# Patient Record
Sex: MALE | Race: BLACK OR AFRICAN AMERICAN | NOT HISPANIC OR LATINO | Employment: FULL TIME | URBAN - METROPOLITAN AREA
[De-identification: names, ages, dates, MRNs, and addresses within clinical notes are randomized per-mention and may not be internally consistent; named-entity substitution may affect disease eponyms.]

---

## 2023-04-24 ENCOUNTER — OFFICE VISIT (OUTPATIENT)
Dept: PRIMARY CARE | Facility: CLINIC | Age: 83
End: 2023-04-24
Payer: MEDICARE

## 2023-04-24 VITALS — TEMPERATURE: 97.8 F | BODY MASS INDEX: 21.61 KG/M2 | WEIGHT: 150.6 LBS

## 2023-04-24 DIAGNOSIS — I10 ESSENTIAL HYPERTENSION: ICD-10-CM

## 2023-04-24 DIAGNOSIS — E78.49 OTHER HYPERLIPIDEMIA: ICD-10-CM

## 2023-04-24 DIAGNOSIS — R35.1 BENIGN PROSTATIC HYPERPLASIA WITH NOCTURIA: ICD-10-CM

## 2023-04-24 DIAGNOSIS — J32.9 CHRONIC SINUSITIS, UNSPECIFIED LOCATION: ICD-10-CM

## 2023-04-24 DIAGNOSIS — N40.1 BENIGN PROSTATIC HYPERPLASIA WITH NOCTURIA: ICD-10-CM

## 2023-04-24 DIAGNOSIS — K21.9 GASTROESOPHAGEAL REFLUX DISEASE WITHOUT ESOPHAGITIS: ICD-10-CM

## 2023-04-24 DIAGNOSIS — M54.16 LUMBAR RADICULOPATHY: Primary | ICD-10-CM

## 2023-04-24 DIAGNOSIS — J43.9 PULMONARY EMPHYSEMA, UNSPECIFIED EMPHYSEMA TYPE (MULTI): ICD-10-CM

## 2023-04-24 DIAGNOSIS — H93.13 TINNITUS AURIUM, BILATERAL: ICD-10-CM

## 2023-04-24 DIAGNOSIS — F17.200 TOBACCO DEPENDENCE: ICD-10-CM

## 2023-04-24 PROBLEM — D17.1 LIPOMA OF BACK: Status: ACTIVE | Noted: 2023-04-24

## 2023-04-24 PROBLEM — M54.9 ACUTE BACK PAIN: Status: ACTIVE | Noted: 2023-04-24

## 2023-04-24 PROBLEM — R36.9 PENILE DISCHARGE: Status: ACTIVE | Noted: 2023-04-24

## 2023-04-24 PROBLEM — A59.9 TRICHOMONIASIS: Status: ACTIVE | Noted: 2023-04-24

## 2023-04-24 PROBLEM — D64.9 ANEMIA: Status: ACTIVE | Noted: 2023-04-24

## 2023-04-24 PROBLEM — J20.9 BRONCHITIS, ACUTE: Status: ACTIVE | Noted: 2023-04-24

## 2023-04-24 PROBLEM — R91.1 PULMONARY NODULE: Status: ACTIVE | Noted: 2023-04-24

## 2023-04-24 PROBLEM — B34.9 VIRAL SYNDROME: Status: ACTIVE | Noted: 2023-04-24

## 2023-04-24 PROBLEM — R42 DIZZINESS: Status: ACTIVE | Noted: 2023-04-24

## 2023-04-24 PROBLEM — R39.15 URINARY URGENCY: Status: ACTIVE | Noted: 2023-04-24

## 2023-04-24 PROBLEM — R07.89 ATYPICAL CHEST PAIN: Status: ACTIVE | Noted: 2023-04-24

## 2023-04-24 PROBLEM — N28.1 CYST, KIDNEY, ACQUIRED: Status: ACTIVE | Noted: 2023-04-24

## 2023-04-24 PROBLEM — E78.5 HLD (HYPERLIPIDEMIA): Status: ACTIVE | Noted: 2023-04-24

## 2023-04-24 PROBLEM — F32.A DEPRESSION: Status: ACTIVE | Noted: 2023-04-24

## 2023-04-24 PROBLEM — N48.1 BALANITIS: Status: ACTIVE | Noted: 2023-04-24

## 2023-04-24 PROBLEM — M89.8X9 BONY GROWTH: Status: ACTIVE | Noted: 2023-04-24

## 2023-04-24 PROBLEM — M48.061 SPINAL STENOSIS OF LUMBAR REGION: Status: ACTIVE | Noted: 2023-04-24

## 2023-04-24 PROBLEM — R35.0 URINARY FREQUENCY: Status: ACTIVE | Noted: 2023-04-24

## 2023-04-24 PROBLEM — M43.16 SPONDYLOLISTHESIS, LUMBAR REGION: Status: ACTIVE | Noted: 2023-04-24

## 2023-04-24 PROBLEM — R79.89 LOW TSH LEVEL: Status: ACTIVE | Noted: 2023-04-24

## 2023-04-24 PROBLEM — M54.50 LOWER BACK PAIN: Status: ACTIVE | Noted: 2023-04-24

## 2023-04-24 PROBLEM — J44.9 CHRONIC OBSTRUCTIVE PULMONARY DISEASE (MULTI): Status: ACTIVE | Noted: 2023-04-24

## 2023-04-24 PROCEDURE — 1160F RVW MEDS BY RX/DR IN RCRD: CPT | Performed by: INTERNAL MEDICINE

## 2023-04-24 PROCEDURE — G0439 PPPS, SUBSEQ VISIT: HCPCS | Performed by: INTERNAL MEDICINE

## 2023-04-24 PROCEDURE — 99214 OFFICE O/P EST MOD 30 MIN: CPT | Performed by: INTERNAL MEDICINE

## 2023-04-24 PROCEDURE — 1159F MED LIST DOCD IN RCRD: CPT | Performed by: INTERNAL MEDICINE

## 2023-04-24 PROCEDURE — 4004F PT TOBACCO SCREEN RCVD TLK: CPT | Performed by: INTERNAL MEDICINE

## 2023-04-24 RX ORDER — NAPROXEN 500 MG/1
1 TABLET ORAL
COMMUNITY
Start: 2018-07-23

## 2023-04-24 RX ORDER — VIT C/E/ZN/COPPR/LUTEIN/ZEAXAN 250MG-90MG
CAPSULE ORAL
COMMUNITY

## 2023-04-24 RX ORDER — HYDROCHLOROTHIAZIDE 12.5 MG/1
12.5 TABLET ORAL EVERY MORNING
Qty: 30 TABLET | Refills: 2 | Status: SHIPPED | OUTPATIENT
Start: 2023-04-24 | End: 2023-04-26 | Stop reason: SDUPTHER

## 2023-04-24 RX ORDER — MECLIZINE HYDROCHLORIDE 25 MG/1
1 TABLET ORAL 3 TIMES DAILY PRN
COMMUNITY
Start: 2021-09-10

## 2023-04-24 RX ORDER — SIMVASTATIN 40 MG/1
40 TABLET, FILM COATED ORAL EVERY EVENING
COMMUNITY
End: 2023-08-15 | Stop reason: SDUPTHER

## 2023-04-24 RX ORDER — IBUPROFEN 800 MG/1
1 TABLET ORAL EVERY 8 HOURS PRN
COMMUNITY
Start: 2022-12-20

## 2023-04-24 RX ORDER — HYDROCHLOROTHIAZIDE 12.5 MG/1
12.5 TABLET ORAL EVERY MORNING
COMMUNITY
End: 2023-04-24 | Stop reason: SDUPTHER

## 2023-04-24 RX ORDER — PNEUMOCOCCAL VACCINE POLYVALENT 25; 25; 25; 25; 25; 25; 25; 25; 25; 25; 25; 25; 25; 25; 25; 25; 25; 25; 25; 25; 25; 25; 25 UG/.5ML; UG/.5ML; UG/.5ML; UG/.5ML; UG/.5ML; UG/.5ML; UG/.5ML; UG/.5ML; UG/.5ML; UG/.5ML; UG/.5ML; UG/.5ML; UG/.5ML; UG/.5ML; UG/.5ML; UG/.5ML; UG/.5ML; UG/.5ML; UG/.5ML; UG/.5ML; UG/.5ML; UG/.5ML; UG/.5ML
INJECTION, SOLUTION INTRAMUSCULAR; SUBCUTANEOUS
COMMUNITY
Start: 2021-03-12

## 2023-04-24 ASSESSMENT — ENCOUNTER SYMPTOMS
OCCASIONAL FEELINGS OF UNSTEADINESS: 0
DEPRESSION: 0
LOSS OF SENSATION IN FEET: 0

## 2023-04-24 NOTE — PROGRESS NOTES
"Gordon Camilo is a 83 y.o. male here for a Medicare Wellness Exam.    No chief complaint on file.       Patient with a past medical history of COPD, HTN, HLD, GERD, Depression Lumbar stenosis, BPH with LUTS Pulmonary Nodule, Tubular adenoma (due 2025)    Has tinnitus  Both ears, no hearing loss  But has been exposed to loud noises at work    Still smoking, now beginning to notice NGUYEN  1PPD  No chest pain/ dizziness  BM OK  Energy level ok  Appetite OK             Medicare Wellness Exam    The patent is being seen for a follow up annual wellness visit  Past Medical, Surgical and family History: Reviewed and updated in chart  Interval History: Patient has not been hospitalized previously  Medications and Supplements: Review of all medications by a prescribing practitioner or clinical pharmacist (such as prescriptions, OTC, Herbal therapies and supplements) documented in the medical record.    Patient Self-Assessment of health: Good  Tobacco Use: Yes  Alcohol Use: Yes  Illicit drug use: No  Patient using opioids: No    Current Diet: in general, a \"healthy\" diet    Adequate fluid intake: Yes  Caffeine intake: Yes  Exercise frequency: not active    Depression/Suicide screening: PHQ2/ PHQ9 (see screenings tab)    Hearing impairment: No  Uses hearing aids N/A  Cognitive impairment Observation: No   Patient or family reported cognitive impairment: No    Bathing: independent  Dressing: independent  Walking: independent  Taking Medications: independent  Feeding: independent  Personal Hygiene: independent  Managing Finances: independent  Shopping: independent  Housework/Basic Home Maintenance: independent  Handling transportation: independent  Preparing meals: independent    Bowels: continent  Bladder: continent    Falls Risk: has notfallen in last 6 months.   Their fall has not resulted in an injury.   Fall risk Factors: Fall Risk Factors:    none   Care Plan Risk: Care Plan: Low/Moderate Risk: Regular physical activity such " as walking, water aerobics or arcenio chi to improve strength, balance, coordination and flexibility. Wear appropriate, sensible shoe wear. Remove fall hazards at home such as loose rugs, obstacles, use non-slip surface in bath or shower. Keep living space well lit.      Home Safety Risk Factors: Home Safety Risk Factors: None  Advanced Directives:  Living will: No POA: No    Patient's End of Life Decisions: Provider agree to follow.      Past Medical History:   Diagnosis Date    Chronic sinusitis, unspecified 09/23/2015    Chronic sinusitis    Essential (primary) hypertension 10/17/2022    Hypertension    Male erectile dysfunction, unspecified 06/14/2013    Organic impotence    Meniere's disease, 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        Review of Systems   Constitutional: no fever, no chills, not feeling poorly, not feeling tired and no recent weight gain, no recent weight loss.   ENT: no earache, no hearing loss, no nosebleeds, no nasal discharge, no sore throat and no hoarseness.   Cardiovascular: the heart rate was not slow, the heart rate was not fast, no chest pain, no palpitations, no intermittent leg claudication and no lower extremity edema.   Respiratory: no cough, wheezing or shortness of breath at rest or exertion  Gastrointestinal: no abdominal pain, no constipation, no melena, no nausea, no diarrhea, no vomiting and no blood in stools.   Musculoskeletal: no arthralgias, no myalgias, no back pain, no joint swelling, no joint stiffness, no limb pain and no limb swelling.   Integumentary: no skin rashes, no skin lesions, no itching, no skin wound and no dry skin.   Neurological: no headache, no confusion, no numbness, no dizziness, no tingling and no fainting.   All other systems have been reviewed and are negative for complaint.   "        12/30/2021    10:48 AM 4/5/2022    10:00 AM 4/5/2022    10:14 AM 7/8/2022    10:16 AM 10/17/2022     4:40 PM 10/17/2022     4:56 PM 4/24/2023     4:35 PM   Vitals   Systolic   130   120    Diastolic   70   70    Heart Rate   72   70    Temp  36.9 °C (98.4 °F)     36.6 °C (97.8 °F)   Resp   16   16    Height (in) 1.778 m (5' 10\") 1.778 m (5' 10\")  1.778 m (5' 10\")      Weight (lb) 150 149.38  151.38 150  150.6   BMI 21.52 kg/m2 21.43 kg/m2  21.72 kg/m2 21.52 kg/m2  21.61 kg/m2   BSA (m2) 1.83 m2 1.83 m2  1.84 m2 1.83 m2  1.84 m2   Visit Report       Report       Physical Exam   Constitutional   General appearance: Alert and in no acute distress.     Pulmonary   Respiratory assessment: No respiratory distress, normal respiratory rhythm and effort.    Auscultation of Lungs: Clear bilateral breath sounds.   Cardiovascular   Auscultation of heart: Apical pulse normal, heart rate and rhythm normal, normal S1 and S2, no murmurs and no pericardial rub.    Exam for edema: No peripheral edema.   Abdomen   Abdominal Exam: No bruits, normal bowel sounds, soft, non-tender, no abdominal mass palpated.    Liver and Spleen exam: No hepato-splenomegaly.   Musculoskeletal   Examination of gait: Normal.    Inspection of digits and nails: No clubbing or cyanosis of the fingernails.    Inspection/palpation of joints, bones and muscles: No joint swelling. Normal movement of all extremities.   Skin   Skin inspection: Normal skin color and pigmentation, normal skin turgor and no visible rash.   Neurologic   Cranial nerves: Nerves 2-12 were intact, no focal neuro defects.         No results found for requested labs within last 365 days.       Assessment/Plan          Patient with a past medical history of COPD, HTN, HLD, GERD, Depression Lumbar stenosis, BPH with LUTS Pulmonary Nodule, Tubular adenoma (due 2025)     # HTN  condition is stable  continue current medications        # HLD  condition is stable  continue current " medications        # Lumbar stenosis  condition is stable  continue current medications     # BPH with LUTS  stable  minimal Nocturia     # Tobacco dependance/ COPD  counseled again    Discussed getting Health Care Power of  and Living Will Documents and their importance. These are legal documents obtained through a . Total Time spent in this discussion was less than 30 minutes

## 2023-04-25 ENCOUNTER — LAB (OUTPATIENT)
Dept: LAB | Facility: LAB | Age: 83
End: 2023-04-25
Payer: MEDICARE

## 2023-04-25 DIAGNOSIS — I10 ESSENTIAL HYPERTENSION: ICD-10-CM

## 2023-04-25 LAB
ALANINE AMINOTRANSFERASE (SGPT) (U/L) IN SER/PLAS: 16 U/L (ref 10–52)
ALBUMIN (G/DL) IN SER/PLAS: 4.6 G/DL (ref 3.4–5)
ALKALINE PHOSPHATASE (U/L) IN SER/PLAS: 77 U/L (ref 33–136)
ANION GAP IN SER/PLAS: 12 MMOL/L (ref 10–20)
ASPARTATE AMINOTRANSFERASE (SGOT) (U/L) IN SER/PLAS: 22 U/L (ref 9–39)
BILIRUBIN TOTAL (MG/DL) IN SER/PLAS: 0.4 MG/DL (ref 0–1.2)
CALCIUM (MG/DL) IN SER/PLAS: 9.9 MG/DL (ref 8.6–10.6)
CARBON DIOXIDE, TOTAL (MMOL/L) IN SER/PLAS: 29 MMOL/L (ref 21–32)
CHLORIDE (MMOL/L) IN SER/PLAS: 103 MMOL/L (ref 98–107)
CREATININE (MG/DL) IN SER/PLAS: 1.12 MG/DL (ref 0.5–1.3)
ERYTHROCYTE DISTRIBUTION WIDTH (RATIO) BY AUTOMATED COUNT: 13.8 % (ref 11.5–14.5)
ERYTHROCYTE MEAN CORPUSCULAR HEMOGLOBIN CONCENTRATION (G/DL) BY AUTOMATED: 32.6 G/DL (ref 32–36)
ERYTHROCYTE MEAN CORPUSCULAR VOLUME (FL) BY AUTOMATED COUNT: 100 FL (ref 80–100)
ERYTHROCYTES (10*6/UL) IN BLOOD BY AUTOMATED COUNT: 3.87 X10E12/L (ref 4.5–5.9)
GFR MALE: 66 ML/MIN/1.73M2
GLUCOSE (MG/DL) IN SER/PLAS: 105 MG/DL (ref 74–99)
HEMATOCRIT (%) IN BLOOD BY AUTOMATED COUNT: 38.6 % (ref 41–52)
HEMOGLOBIN (G/DL) IN BLOOD: 12.6 G/DL (ref 13.5–17.5)
LEUKOCYTES (10*3/UL) IN BLOOD BY AUTOMATED COUNT: 8 X10E9/L (ref 4.4–11.3)
NRBC (PER 100 WBCS) BY AUTOMATED COUNT: 0 /100 WBC (ref 0–0)
PLATELETS (10*3/UL) IN BLOOD AUTOMATED COUNT: 319 X10E9/L (ref 150–450)
POTASSIUM (MMOL/L) IN SER/PLAS: 4.7 MMOL/L (ref 3.5–5.3)
PROTEIN TOTAL: 7.5 G/DL (ref 6.4–8.2)
SODIUM (MMOL/L) IN SER/PLAS: 139 MMOL/L (ref 136–145)
THYROTROPIN (MIU/L) IN SER/PLAS BY DETECTION LIMIT <= 0.05 MIU/L: 0.3 MIU/L (ref 0.44–3.98)
THYROXINE (T4) FREE (NG/DL) IN SER/PLAS: 1.15 NG/DL (ref 0.78–1.48)
UREA NITROGEN (MG/DL) IN SER/PLAS: 17 MG/DL (ref 6–23)

## 2023-04-25 PROCEDURE — 84443 ASSAY THYROID STIM HORMONE: CPT

## 2023-04-25 PROCEDURE — 80053 COMPREHEN METABOLIC PANEL: CPT

## 2023-04-25 PROCEDURE — 36415 COLL VENOUS BLD VENIPUNCTURE: CPT

## 2023-04-25 PROCEDURE — 84439 ASSAY OF FREE THYROXINE: CPT

## 2023-04-25 PROCEDURE — 85027 COMPLETE CBC AUTOMATED: CPT

## 2023-04-26 DIAGNOSIS — I10 ESSENTIAL HYPERTENSION: ICD-10-CM

## 2023-04-27 RX ORDER — HYDROCHLOROTHIAZIDE 12.5 MG/1
12.5 TABLET ORAL EVERY MORNING
Qty: 30 TABLET | Refills: 2 | Status: SHIPPED | OUTPATIENT
Start: 2023-04-27 | End: 2023-08-15 | Stop reason: SDUPTHER

## 2023-08-03 ENCOUNTER — APPOINTMENT (OUTPATIENT)
Dept: PRIMARY CARE | Facility: CLINIC | Age: 83
End: 2023-08-03
Payer: MEDICARE

## 2023-08-15 ENCOUNTER — OFFICE VISIT (OUTPATIENT)
Dept: PRIMARY CARE | Facility: CLINIC | Age: 83
End: 2023-08-15
Payer: MEDICARE

## 2023-08-15 VITALS
WEIGHT: 149.4 LBS | SYSTOLIC BLOOD PRESSURE: 130 MMHG | RESPIRATION RATE: 16 BRPM | TEMPERATURE: 98.4 F | BODY MASS INDEX: 21.44 KG/M2 | DIASTOLIC BLOOD PRESSURE: 70 MMHG | HEART RATE: 70 BPM

## 2023-08-15 DIAGNOSIS — E78.49 OTHER HYPERLIPIDEMIA: ICD-10-CM

## 2023-08-15 DIAGNOSIS — N40.1 BENIGN PROSTATIC HYPERPLASIA WITH NOCTURIA: ICD-10-CM

## 2023-08-15 DIAGNOSIS — R35.1 BENIGN PROSTATIC HYPERPLASIA WITH NOCTURIA: ICD-10-CM

## 2023-08-15 DIAGNOSIS — J43.9 PULMONARY EMPHYSEMA, UNSPECIFIED EMPHYSEMA TYPE (MULTI): ICD-10-CM

## 2023-08-15 DIAGNOSIS — I10 ESSENTIAL HYPERTENSION: Primary | ICD-10-CM

## 2023-08-15 PROBLEM — H90.3 BILATERAL SENSORINEURAL HEARING LOSS: Status: ACTIVE | Noted: 2023-08-15

## 2023-08-15 LAB
POC HDL CHOLESTEROL: 82 MG/DL (ref 0–40)
POC LDL CHOLESTEROL: 91 MG/DL (ref 0–100)
POC NON-HDL CHOLESTEROL: 111 MG/DL (ref 0–130)
POC TOTAL CHOLESTEROL/HDL RATIO: 2.4 (ref 0–4.5)
POC TOTAL CHOLESTEROL: 193 MG/DL (ref 0–199)
POC TRIGLYCERIDES: 105 MG/DL (ref 0–150)

## 2023-08-15 PROCEDURE — 80061 LIPID PANEL: CPT | Performed by: INTERNAL MEDICINE

## 2023-08-15 PROCEDURE — 3078F DIAST BP <80 MM HG: CPT | Performed by: INTERNAL MEDICINE

## 2023-08-15 PROCEDURE — 4004F PT TOBACCO SCREEN RCVD TLK: CPT | Performed by: INTERNAL MEDICINE

## 2023-08-15 PROCEDURE — 1159F MED LIST DOCD IN RCRD: CPT | Performed by: INTERNAL MEDICINE

## 2023-08-15 PROCEDURE — 1126F AMNT PAIN NOTED NONE PRSNT: CPT | Performed by: INTERNAL MEDICINE

## 2023-08-15 PROCEDURE — 1160F RVW MEDS BY RX/DR IN RCRD: CPT | Performed by: INTERNAL MEDICINE

## 2023-08-15 PROCEDURE — 99214 OFFICE O/P EST MOD 30 MIN: CPT | Performed by: INTERNAL MEDICINE

## 2023-08-15 PROCEDURE — 3075F SYST BP GE 130 - 139MM HG: CPT | Performed by: INTERNAL MEDICINE

## 2023-08-15 RX ORDER — ACETAMINOPHEN 500 MG
1 TABLET ORAL DAILY
COMMUNITY

## 2023-08-15 RX ORDER — POTASSIUM CHLORIDE 1500 MG/1
20 TABLET, EXTENDED RELEASE ORAL DAILY
COMMUNITY
Start: 2015-12-26 | End: 2023-11-21 | Stop reason: SDUPTHER

## 2023-08-15 RX ORDER — SIMVASTATIN 40 MG/1
40 TABLET, FILM COATED ORAL EVERY EVENING
Qty: 60 TABLET | Refills: 2 | Status: SHIPPED | OUTPATIENT
Start: 2023-08-15 | End: 2023-11-21 | Stop reason: SDUPTHER

## 2023-08-15 RX ORDER — HYDROCHLOROTHIAZIDE 12.5 MG/1
12.5 TABLET ORAL EVERY MORNING
Qty: 30 TABLET | Refills: 2 | Status: SHIPPED | OUTPATIENT
Start: 2023-08-15 | End: 2023-11-21 | Stop reason: SDUPTHER

## 2023-08-15 NOTE — PROGRESS NOTES
Gordon Camilo is a 83 y.o. male   Patient with a past medical history of COPD, HTN, HLD, GERD, Depression Lumbar stenosis, BPH with LUTS Pulmonary Nodule, Renal Cysts, Tubular adenoma (due 2025)    Gets occasional numbness in left elbow that comes and goes with certain hand positions    Feels fine  No chest pain/  SOB/ dizziness  BM OK  Energy level ok  Appetite OK             Review of Systems     Constitutional: no fever, no chills, not feeling poorly, not feeling tired and no recent weight gain, no recent weight loss.   ENT: no earache, no hearing loss, no nosebleeds, no nasal discharge, no sore throat and no hoarseness.   Cardiovascular: the heart rate was not slow, the heart rate was not fast, no chest pain, no palpitations, no intermittent leg claudication and no lower extremity edema.   Respiratory: no cough, wheezing or shortness of breath at rest or exertion  Gastrointestinal: no abdominal pain, no constipation, no melena, no nausea, no diarrhea, no vomiting and no blood in stools.   Musculoskeletal: no arthralgias, no myalgias, no back pain, no joint swelling, no joint stiffness, no limb pain and no limb swelling.   Integumentary: no skin rashes, no skin lesions, no itching, no skin wound and no dry skin.   Neurological: no headache, no confusion, no numbness, no dizziness, no tingling and no fainting.   All other systems have been reviewed and are negative for complaint.       Vitals:    08/15/23 1659   Temp: 36.9 °C (98.4 °F)        Physical Exam     Constitutional   General appearance: Alert and in no acute distress.     Pulmonary   Respiratory assessment: No respiratory distress, normal respiratory rhythm and effort.    Auscultation of Lungs: Clear bilateral breath sounds.   Cardiovascular   Auscultation of heart: Apical pulse normal, heart rate and rhythm normal, normal S1 and S2, no murmurs and no pericardial rub.    Exam for edema: No peripheral edema.   Abdomen   Abdominal Exam: No bruits, normal  bowel sounds, soft, non-tender, no abdominal mass palpated.    Liver and Spleen exam: No hepato-splenomegaly.   Musculoskeletal   Examination of gait: Normal.    Inspection of digits and nails: No clubbing or cyanosis of the fingernails.    Inspection/palpation of joints, bones and muscles: No joint swelling. Normal movement of all extremities.   Skin   Skin inspection: Normal skin color and pigmentation, normal skin turgor and no visible rash.   Neurologic   Cranial nerves: Nerves 2-12 were intact, no focal neuro defects.     Assessment/Plan        Patient with a past medical history of COPD, HTN, HLD, GERD, Depression Lumbar stenosis, BPH with LUTS Pulmonary Nodule,  Renal Cysts,Tubular adenoma (due 2025)    # HTN/ Hypokalemia  Stable  Continue current medications    # HLD  Stable  Continue current medications  Watch salt, avoid excessive caffeine  Avoid processed meats/ sugars/juices Instead eat fresh fruit  Add walnuts and almonds to your diet  exercise 6 days a week for 30 minutes    # COPD/ Tobacco dependence  Counseled again

## 2023-11-21 ENCOUNTER — OFFICE VISIT (OUTPATIENT)
Dept: PRIMARY CARE | Facility: CLINIC | Age: 83
End: 2023-11-21
Payer: MEDICARE

## 2023-11-21 VITALS
DIASTOLIC BLOOD PRESSURE: 70 MMHG | TEMPERATURE: 98.3 F | WEIGHT: 146.2 LBS | BODY MASS INDEX: 20.98 KG/M2 | SYSTOLIC BLOOD PRESSURE: 120 MMHG | HEART RATE: 70 BPM | RESPIRATION RATE: 18 BRPM

## 2023-11-21 DIAGNOSIS — I10 ESSENTIAL HYPERTENSION: ICD-10-CM

## 2023-11-21 DIAGNOSIS — E87.6 HYPOKALEMIA: ICD-10-CM

## 2023-11-21 DIAGNOSIS — E78.49 OTHER HYPERLIPIDEMIA: Primary | ICD-10-CM

## 2023-11-21 DIAGNOSIS — Z23 INFLUENZA VACCINATION ADMINISTERED AT CURRENT VISIT: ICD-10-CM

## 2023-11-21 PROCEDURE — 1159F MED LIST DOCD IN RCRD: CPT | Performed by: INTERNAL MEDICINE

## 2023-11-21 PROCEDURE — 99214 OFFICE O/P EST MOD 30 MIN: CPT | Performed by: INTERNAL MEDICINE

## 2023-11-21 PROCEDURE — 3074F SYST BP LT 130 MM HG: CPT | Performed by: INTERNAL MEDICINE

## 2023-11-21 PROCEDURE — 1126F AMNT PAIN NOTED NONE PRSNT: CPT | Performed by: INTERNAL MEDICINE

## 2023-11-21 PROCEDURE — 1160F RVW MEDS BY RX/DR IN RCRD: CPT | Performed by: INTERNAL MEDICINE

## 2023-11-21 PROCEDURE — 90662 IIV NO PRSV INCREASED AG IM: CPT | Performed by: INTERNAL MEDICINE

## 2023-11-21 PROCEDURE — 3078F DIAST BP <80 MM HG: CPT | Performed by: INTERNAL MEDICINE

## 2023-11-21 PROCEDURE — G0008 ADMIN INFLUENZA VIRUS VAC: HCPCS | Performed by: INTERNAL MEDICINE

## 2023-11-21 PROCEDURE — 4004F PT TOBACCO SCREEN RCVD TLK: CPT | Performed by: INTERNAL MEDICINE

## 2023-11-21 RX ORDER — POTASSIUM CHLORIDE 20 MEQ/1
20 TABLET, EXTENDED RELEASE ORAL DAILY
Qty: 90 TABLET | Refills: 3 | Status: SHIPPED | OUTPATIENT
Start: 2023-11-21 | End: 2024-11-15

## 2023-11-21 RX ORDER — SIMVASTATIN 40 MG/1
40 TABLET, FILM COATED ORAL EVERY EVENING
Qty: 90 TABLET | Refills: 2 | Status: SHIPPED | OUTPATIENT
Start: 2023-11-21 | End: 2024-08-17

## 2023-11-21 RX ORDER — HYDROCHLOROTHIAZIDE 12.5 MG/1
12.5 TABLET ORAL EVERY MORNING
Qty: 60 TABLET | Refills: 3 | Status: SHIPPED | OUTPATIENT
Start: 2023-11-21 | End: 2024-01-05 | Stop reason: SDUPTHER

## 2023-11-21 NOTE — PROGRESS NOTES
Gordon Camilo is a 83 y.o. male   Patient with a past medical history of COPD, HTN, HLD, GERD, Depression Lumbar stenosis, BPH with LUTS Pulmonary Nodule, Renal Cysts, Tubular adenoma (due 2025)    Got the RSV/ Flu/ Covid vaccines      Feels fine  No chest pain/  SOB/ dizziness  BM OK  Energy level ok  Appetite OK    Still smokes  No SOB./ cough             Review of Systems     Constitutional: no fever, no chills, not feeling poorly, not feeling tired and no recent weight gain, no recent weight loss.   ENT: no earache, no hearing loss, no nosebleeds, no nasal discharge, no sore throat and no hoarseness.   Cardiovascular: the heart rate was not slow, the heart rate was not fast, no chest pain, no palpitations, no intermittent leg claudication and no lower extremity edema.   Respiratory: no cough, wheezing or shortness of breath at rest or exertion  Gastrointestinal: no abdominal pain, no constipation, no melena, no nausea, no diarrhea, no vomiting and no blood in stools.   Musculoskeletal: no arthralgias, no myalgias, no back pain, no joint swelling, no joint stiffness, no limb pain and no limb swelling.   Integumentary: no skin rashes, no skin lesions, no itching, no skin wound and no dry skin.   Neurological: no headache, no confusion, no numbness, no dizziness, no tingling and no fainting.   All other systems have been reviewed and are negative for complaint.       Vitals:    11/21/23 1658   BP: 120/70   Pulse: 70   Resp: 18   Temp: 36.8 °C (98.3 °F)        Physical Exam     Constitutional   General appearance: Alert and in no acute distress.     Pulmonary   Respiratory assessment: No respiratory distress, normal respiratory rhythm and effort.    Auscultation of Lungs: Clear bilateral breath sounds.   Cardiovascular   Auscultation of heart: Apical pulse normal, heart rate and rhythm normal, normal S1 and S2, no murmurs and no pericardial rub.    Exam for edema: No peripheral edema.   Abdomen   Abdominal Exam: No  bruits, normal bowel sounds, soft, non-tender, no abdominal mass palpated.    Liver and Spleen exam: No hepato-splenomegaly.   Musculoskeletal   Examination of gait: Normal.    Inspection of digits and nails: No clubbing or cyanosis of the fingernails.    Inspection/palpation of joints, bones and muscles: No joint swelling. Normal movement of all extremities.   Skin   Skin inspection: Normal skin color and pigmentation, normal skin turgor and no visible rash.   Neurologic   Cranial nerves: Nerves 2-12 were intact, no focal neuro defects.     Assessment/Plan        Patient with a past medical history of COPD, HTN, HLD, GERD, Depression Lumbar stenosis, BPH with LUTS Pulmonary Nodule,  Renal Cysts,Tubular adenoma (due 2025)    # HTN/ Hypokalemia  Stable  Continue current medications    # HLD  Stable  Continue current medications  Watch salt, avoid excessive caffeine  Avoid processed meats/ sugars/juices Instead eat fresh fruit  Add walnuts and almonds to your diet  exercise 6 days a week for 30 minutes    # COPD/ Tobacco dependence  Counseled again    Total appt time today was 35  minutes. Time included preparing to see the pt, obtaining the hx, performing the medically necessary appropriate physical exam, counseling & educating the pt, ordering tests & procedures, referring & communicating w/other providers, independently interpreting results & communicating the results to the pt, care, coordination & documenting clinical information in the medical record.

## 2024-01-05 DIAGNOSIS — I10 ESSENTIAL HYPERTENSION: ICD-10-CM

## 2024-01-05 RX ORDER — HYDROCHLOROTHIAZIDE 12.5 MG/1
12.5 TABLET ORAL EVERY MORNING
Qty: 60 TABLET | Refills: 3 | Status: SHIPPED | OUTPATIENT
Start: 2024-01-05 | End: 2024-09-01

## 2024-01-05 NOTE — TELEPHONE ENCOUNTER
RX: Hydrochlorothiazide 12.5 mg tab            [facility]

## 2024-02-26 ENCOUNTER — HOSPITAL ENCOUNTER (OUTPATIENT)
Dept: RADIOLOGY | Facility: HOSPITAL | Age: 84
Discharge: HOME | End: 2024-02-26
Payer: MEDICARE

## 2024-02-26 ENCOUNTER — OFFICE VISIT (OUTPATIENT)
Dept: PRIMARY CARE | Facility: CLINIC | Age: 84
End: 2024-02-26
Payer: MEDICARE

## 2024-02-26 ENCOUNTER — LAB (OUTPATIENT)
Dept: LAB | Facility: LAB | Age: 84
End: 2024-02-26
Payer: MEDICARE

## 2024-02-26 VITALS
SYSTOLIC BLOOD PRESSURE: 130 MMHG | RESPIRATION RATE: 18 BRPM | BODY MASS INDEX: 20.98 KG/M2 | HEART RATE: 68 BPM | DIASTOLIC BLOOD PRESSURE: 70 MMHG | WEIGHT: 146.2 LBS | TEMPERATURE: 98.4 F

## 2024-02-26 DIAGNOSIS — J43.9 PULMONARY EMPHYSEMA, UNSPECIFIED EMPHYSEMA TYPE (MULTI): ICD-10-CM

## 2024-02-26 DIAGNOSIS — E46 PROTEIN-CALORIE MALNUTRITION, UNSPECIFIED SEVERITY (MULTI): ICD-10-CM

## 2024-02-26 DIAGNOSIS — I10 ESSENTIAL HYPERTENSION: ICD-10-CM

## 2024-02-26 DIAGNOSIS — F17.200 TOBACCO DEPENDENCE: ICD-10-CM

## 2024-02-26 DIAGNOSIS — Z71.89 ADVANCED CARE PLANNING/COUNSELING DISCUSSION: ICD-10-CM

## 2024-02-26 DIAGNOSIS — E78.49 OTHER HYPERLIPIDEMIA: ICD-10-CM

## 2024-02-26 DIAGNOSIS — Z00.00 ROUTINE GENERAL MEDICAL EXAMINATION AT HEALTH CARE FACILITY: Primary | ICD-10-CM

## 2024-02-26 LAB
ALBUMIN SERPL BCP-MCNC: 4.4 G/DL (ref 3.4–5)
ALP SERPL-CCNC: 73 U/L (ref 33–136)
ALT SERPL W P-5'-P-CCNC: 10 U/L (ref 10–52)
ANION GAP SERPL CALC-SCNC: 13 MMOL/L (ref 10–20)
AST SERPL W P-5'-P-CCNC: 17 U/L (ref 9–39)
BILIRUB SERPL-MCNC: 0.3 MG/DL (ref 0–1.2)
BUN SERPL-MCNC: 25 MG/DL (ref 6–23)
CALCIUM SERPL-MCNC: 9.9 MG/DL (ref 8.6–10.3)
CHLORIDE SERPL-SCNC: 102 MMOL/L (ref 98–107)
CO2 SERPL-SCNC: 28 MMOL/L (ref 21–32)
CREAT SERPL-MCNC: 1.25 MG/DL (ref 0.5–1.3)
EGFRCR SERPLBLD CKD-EPI 2021: 57 ML/MIN/1.73M*2
ERYTHROCYTE [DISTWIDTH] IN BLOOD BY AUTOMATED COUNT: 14 % (ref 11.5–14.5)
GLUCOSE SERPL-MCNC: 88 MG/DL (ref 74–99)
HCT VFR BLD AUTO: 39.5 % (ref 41–52)
HGB BLD-MCNC: 13 G/DL (ref 13.5–17.5)
MCH RBC QN AUTO: 32.4 PG (ref 26–34)
MCHC RBC AUTO-ENTMCNC: 32.9 G/DL (ref 32–36)
MCV RBC AUTO: 99 FL (ref 80–100)
NRBC BLD-RTO: 0 /100 WBCS (ref 0–0)
PLATELET # BLD AUTO: 333 X10*3/UL (ref 150–450)
POC HDL CHOLESTEROL: 70 MG/DL (ref 0–40)
POC LDL CHOLESTEROL: 56 MG/DL (ref 0–100)
POC NON-HDL CHOLESTEROL: 76 MG/DL (ref 0–130)
POC TOTAL CHOLESTEROL/HDL RATIO: 2.1 (ref 0–4.5)
POC TOTAL CHOLESTEROL: 146 MG/DL (ref 0–199)
POC TRIGLYCERIDES: 100 MG/DL (ref 0–150)
POTASSIUM SERPL-SCNC: 4.7 MMOL/L (ref 3.5–5.3)
PROT SERPL-MCNC: 7.4 G/DL (ref 6.4–8.2)
RBC # BLD AUTO: 4.01 X10*6/UL (ref 4.5–5.9)
SODIUM SERPL-SCNC: 138 MMOL/L (ref 136–145)
T4 FREE SERPL-MCNC: 0.71 NG/DL (ref 0.61–1.12)
TSH SERPL-ACNC: 0.23 MIU/L (ref 0.44–3.98)
WBC # BLD AUTO: 7.1 X10*3/UL (ref 4.4–11.3)

## 2024-02-26 PROCEDURE — 1124F ACP DISCUSS-NO DSCNMKR DOCD: CPT | Performed by: INTERNAL MEDICINE

## 2024-02-26 PROCEDURE — 71046 X-RAY EXAM CHEST 2 VIEWS: CPT | Performed by: RADIOLOGY

## 2024-02-26 PROCEDURE — 1159F MED LIST DOCD IN RCRD: CPT | Performed by: INTERNAL MEDICINE

## 2024-02-26 PROCEDURE — 99497 ADVNCD CARE PLAN 30 MIN: CPT | Performed by: INTERNAL MEDICINE

## 2024-02-26 PROCEDURE — 85027 COMPLETE CBC AUTOMATED: CPT

## 2024-02-26 PROCEDURE — 71046 X-RAY EXAM CHEST 2 VIEWS: CPT

## 2024-02-26 PROCEDURE — 80053 COMPREHEN METABOLIC PANEL: CPT

## 2024-02-26 PROCEDURE — 80061 LIPID PANEL: CPT | Performed by: INTERNAL MEDICINE

## 2024-02-26 PROCEDURE — 84439 ASSAY OF FREE THYROXINE: CPT

## 2024-02-26 PROCEDURE — 1126F AMNT PAIN NOTED NONE PRSNT: CPT | Performed by: INTERNAL MEDICINE

## 2024-02-26 PROCEDURE — 99214 OFFICE O/P EST MOD 30 MIN: CPT | Performed by: INTERNAL MEDICINE

## 2024-02-26 PROCEDURE — 84443 ASSAY THYROID STIM HORMONE: CPT

## 2024-02-26 PROCEDURE — 36415 COLL VENOUS BLD VENIPUNCTURE: CPT

## 2024-02-26 PROCEDURE — 3075F SYST BP GE 130 - 139MM HG: CPT | Performed by: INTERNAL MEDICINE

## 2024-02-26 PROCEDURE — 3078F DIAST BP <80 MM HG: CPT | Performed by: INTERNAL MEDICINE

## 2024-02-26 ASSESSMENT — PATIENT HEALTH QUESTIONNAIRE - PHQ9
1. LITTLE INTEREST OR PLEASURE IN DOING THINGS: NOT AT ALL
SUM OF ALL RESPONSES TO PHQ9 QUESTIONS 1 AND 2: 0
2. FEELING DOWN, DEPRESSED OR HOPELESS: NOT AT ALL

## 2024-02-26 ASSESSMENT — ENCOUNTER SYMPTOMS
DEPRESSION: 0
OCCASIONAL FEELINGS OF UNSTEADINESS: 0
LOSS OF SENSATION IN FEET: 0

## 2024-02-26 NOTE — PROGRESS NOTES
Subjective   Reason for Visit: Gordon Camilo is an 83 y.o. male here for a Medicare Wellness visit.          Reviewed all medications by prescribing practitioner or clinical pharmacist (such as prescriptions, OTCs, herbal therapies and supplements) and documented in the medical record.    HPI    Patient Care Team:  Kavin Aguilar MD as PCP - General  Kavin Aguilar MD as PCP - [facility]     Review of Systems    Objective   Vitals:  /70   Pulse 68   Temp 36.9 °C (98.4 °F) (Oral)   Resp 18   Wt 66.3 kg (146 lb 3.2 oz)   BMI 20.98 kg/m²       Physical Exam    Assessment/Plan   Problem List Items Addressed This Visit     Chronic obstructive pulmonary disease (CMS/HCC)    Relevant Orders    XR chest 2 views    Essential hypertension    Relevant Orders    CBC    Comprehensive Metabolic Panel    TSH with reflex to Free T4 if abnormal    HLD (hyperlipidemia)    Relevant Orders    POCT Lipid Panel manually resulted (Completed)    Collection of Capillary Blood Specimen    Tobacco dependence    Relevant Orders    XR chest 2 views    Protein-calorie malnutrition, unspecified severity (CMS/HCC)    Relevant Orders    CBC    Comprehensive Metabolic Panel    TSH with reflex to Free T4 if abnormal   Other Visit Diagnoses     Routine general medical examination at health care facility    -  Primary    Advanced care planning/counseling discussion

## 2024-02-26 NOTE — PROGRESS NOTES
"Gordon Camilo is a 83 y.o. male here for a Medicare Wellness Exam.    No chief complaint on file.       Patient with a past medical history of COPD, HTN, HLD, GERD, Depression Lumbar stenosis, BPH with LUTS Pulmonary Nodule,  Renal Cysts,Tubular adenoma (due 2025)    2 weeks ago had an episode of dry cough/ wheezing rattling    Also has noticed a loss of appetite since the cold  No nausea  No pain/ cramps  No change in bowel habits  Has not lost weight or clothes have gotten loose           Medicare Wellness Exam    The patent is being seen for a follow up annual wellness visit  Past Medical, Surgical and family History: Reviewed and updated in chart  Interval History: Patient has not been hospitalized previously  Medications and Supplements: Review of all medications by a prescribing practitioner or clinical pharmacist (such as prescriptions, OTC, Herbal therapies and supplements) documented in the medical record.    Patient Self-Assessment of health: Good  Tobacco Use: Yes  Alcohol Use: Yes  Illicit drug use: No  Patient using opioids: No    Current Diet: in general, a \"healthy\" diet    Adequate fluid intake: Yes  Caffeine intake: Yes  Exercise frequency: not active    Depression/Suicide screening: PHQ2/ PHQ9 (see screenings tab)    Hearing impairment: No  Uses hearing aids N/A  Cognitive impairment Observation: No   Patient or family reported cognitive impairment: No    Bathing: independent  Dressing: independent  Walking: independent  Taking Medications: independent  Feeding: independent  Personal Hygiene: independent  Managing Finances: independent  Shopping: independent  Housework/Basic Home Maintenance: independent  Handling transportation: independent  Preparing meals: independent    Bowels: continent  Bladder: continent    Falls Risk: has notfallen in last 6 months.   Their fall has not resulted in an injury.   Fall risk Factors: Fall Risk Factors:    none   Care Plan Risk: Care Plan: Low/Moderate Risk: " Regular physical activity such as walking, water aerobics or arcenio chi to improve strength, balance, coordination and flexibility. Wear appropriate, sensible shoe wear. Remove fall hazards at home such as loose rugs, obstacles, use non-slip surface in bath or shower. Keep living space well lit.      Home Safety Risk Factors: Home Safety Risk Factors: None  Advanced Directives:  Living will: No POA: No    Patient's End of Life Decisions: Provider agree to follow.      Past Medical History:   Diagnosis Date    Chronic sinusitis, unspecified 09/23/2015    Chronic sinusitis    Essential (primary) hypertension 10/17/2022    Hypertension    Male erectile dysfunction, unspecified 06/14/2013    Organic impotence    Meniere's disease, 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        Review of Systems     Constitutional: no fever, no chills, not feeling poorly, not feeling tired and no recent weight gain, no recent weight loss.   ENT: no earache, no hearing loss, no nosebleeds, no nasal discharge, no sore throat and no hoarseness.   Cardiovascular: the heart rate was not slow, the heart rate was not fast, no chest pain, no palpitations, no intermittent leg claudication and no lower extremity edema.   Respiratory: no cough, wheezing or shortness of breath at rest or exertion  Gastrointestinal: no abdominal pain, no constipation, no melena, no nausea, no diarrhea, no vomiting and no blood in stools.   Musculoskeletal: no arthralgias, no myalgias, no back pain, no joint swelling, no joint stiffness, no limb pain and no limb swelling.   Integumentary: no skin rashes, no skin lesions, no itching, no skin wound and no dry skin.   Neurological: no headache, no confusion, no numbness, no dizziness, no tingling and no fainting.   All other systems have been reviewed  and are negative for complaint.          Physical Exam     Constitutional   General appearance: Alert and in no acute distress.     Pulmonary   Respiratory assessment: No respiratory distress, normal respiratory rhythm and effort.    Auscultation of Lungs: Clear bilateral breath sounds.   Cardiovascular   Auscultation of heart: Apical pulse normal, heart rate and rhythm normal, normal S1 and S2, no murmurs and no pericardial rub.    Exam for edema: No peripheral edema.   Abdomen   Abdominal Exam: No bruits, normal bowel sounds, soft, non-tender, no abdominal mass palpated.    Liver and Spleen exam: No hepato-splenomegaly.   Musculoskeletal   Examination of gait: Normal.    Inspection of digits and nails: No clubbing or cyanosis of the fingernails.    Inspection/palpation of joints, bones and muscles: No joint swelling. Normal movement of all extremities.   Skin   Skin inspection: Normal skin color and pigmentation, normal skin turgor and no visible rash.   Neurologic   Cranial nerves: Nerves 2-12 were intact, no focal neuro defects.          Assessment/Plan            Patient with a past medical history of COPD, HTN, HLD, GERD, Depression Lumbar stenosis, BPH with LUTS Pulmonary Nodule,  Renal Cysts,Tubular adenoma (due 2025)     # HTN/ Hypokalemia  Stable  Continue current medications     # HLD  Stable  Continue current medications  Watch salt, avoid excessive caffeine  Avoid processed meats/ sugars/juices Instead eat fresh fruit  Add walnuts and almonds to your diet  exercise 6 days a week for 30 minutes     # COPD/ Tobacco dependence  Counseled again  Chest xray  Blood work    # malnutrition  Recently lost appetite after a cold  ? Covid  Did not test  Blood work    Discussed getting Health Care Power of  and Living Will Documents and their importance. These are legal documents obtained through a . Total Time spent in this discussion was less than 30 minutes

## 2024-05-30 ENCOUNTER — APPOINTMENT (OUTPATIENT)
Dept: PRIMARY CARE | Facility: CLINIC | Age: 84
End: 2024-05-30
Payer: MEDICARE

## 2024-06-07 ENCOUNTER — OFFICE VISIT (OUTPATIENT)
Dept: PRIMARY CARE | Facility: CLINIC | Age: 84
End: 2024-06-07
Payer: MEDICARE

## 2024-06-07 VITALS
WEIGHT: 150.4 LBS | DIASTOLIC BLOOD PRESSURE: 70 MMHG | SYSTOLIC BLOOD PRESSURE: 120 MMHG | BODY MASS INDEX: 21.58 KG/M2 | RESPIRATION RATE: 16 BRPM | TEMPERATURE: 98.6 F | HEART RATE: 70 BPM

## 2024-06-07 DIAGNOSIS — E78.49 OTHER HYPERLIPIDEMIA: ICD-10-CM

## 2024-06-07 DIAGNOSIS — N18.31 STAGE 3A CHRONIC KIDNEY DISEASE (MULTI): ICD-10-CM

## 2024-06-07 DIAGNOSIS — L02.91 ABSCESS: ICD-10-CM

## 2024-06-07 DIAGNOSIS — I10 ESSENTIAL HYPERTENSION: Primary | ICD-10-CM

## 2024-06-07 PROCEDURE — 1159F MED LIST DOCD IN RCRD: CPT | Performed by: INTERNAL MEDICINE

## 2024-06-07 PROCEDURE — 99214 OFFICE O/P EST MOD 30 MIN: CPT | Performed by: INTERNAL MEDICINE

## 2024-06-07 PROCEDURE — 4004F PT TOBACCO SCREEN RCVD TLK: CPT | Performed by: INTERNAL MEDICINE

## 2024-06-07 PROCEDURE — 1160F RVW MEDS BY RX/DR IN RCRD: CPT | Performed by: INTERNAL MEDICINE

## 2024-06-07 PROCEDURE — 3074F SYST BP LT 130 MM HG: CPT | Performed by: INTERNAL MEDICINE

## 2024-06-07 PROCEDURE — 3078F DIAST BP <80 MM HG: CPT | Performed by: INTERNAL MEDICINE

## 2024-06-07 PROCEDURE — 99406 BEHAV CHNG SMOKING 3-10 MIN: CPT | Performed by: INTERNAL MEDICINE

## 2024-06-07 RX ORDER — DOXYCYCLINE 100 MG/1
100 CAPSULE ORAL 2 TIMES DAILY
Qty: 14 CAPSULE | Refills: 0 | Status: SHIPPED | OUTPATIENT
Start: 2024-06-07 | End: 2024-06-14

## 2024-06-07 NOTE — PROGRESS NOTES
Gordon Camilo is a 84 y.o. male   Patient with a past medical history of COPD, HTN, HLD, CKD IIIa,  Chronic Anemia, GERD, Depression Lumbar stenosis, BPH with LUTS Pulmonary Nodule,  Renal Cysts,Tubular adenoma (due 2025)    Has a boil on back  Started a week ago    No chest pain/  SOB/ dizziness  BM OK  Energy level ok  Appetite OK    Still smokes  No SOB./ cough             Review of Systems     Constitutional: no fever, no chills, not feeling poorly, not feeling tired and no recent weight gain, no recent weight loss.   ENT: no earache, no hearing loss, no nosebleeds, no nasal discharge, no sore throat and no hoarseness.   Cardiovascular: the heart rate was not slow, the heart rate was not fast, no chest pain, no palpitations, no intermittent leg claudication and no lower extremity edema.   Respiratory: no cough, wheezing or shortness of breath at rest or exertion  Gastrointestinal: no abdominal pain, no constipation, no melena, no nausea, no diarrhea, no vomiting and no blood in stools.   Musculoskeletal: no arthralgias, no myalgias, no back pain, no joint swelling, no joint stiffness, no limb pain and no limb swelling.   Integumentary: no skin rashes, no skin lesions, no itching, no skin wound and no dry skin.   Neurological: no headache, no confusion, no numbness, no dizziness, no tingling and no fainting.   All other systems have been reviewed and are negative for complaint.       Vitals:    06/07/24 0903   BP: 120/70   Pulse: 70   Resp: 16   Temp: 37 °C (98.6 °F)          Physical Exam     Constitutional   General appearance: Alert and in no acute distress.     Pulmonary   Respiratory assessment: No respiratory distress, normal respiratory rhythm and effort.    Auscultation of Lungs: Clear bilateral breath sounds.   Cardiovascular   Auscultation of heart: Apical pulse normal, heart rate and rhythm normal, normal S1 and S2, no murmurs and no pericardial rub.    Exam for edema: No peripheral edema.   Abdomen    Abdominal Exam: No bruits, normal bowel sounds, soft, non-tender, no abdominal mass palpated.    Liver and Spleen exam: No hepato-splenomegaly.   Musculoskeletal   Examination of gait: Normal.    Inspection of digits and nails: No clubbing or cyanosis of the fingernails.    Inspection/palpation of joints, bones and muscles: No joint swelling. Normal movement of all extremities.   Skin   Skin inspection: Normal skin color and pigmentation, normal skin turgor and no visible rash.   Neurologic   Cranial nerves: Nerves 2-12 were intact, no focal neuro defects.     Assessment/Plan        Patient with a past medical history of COPD, HTN, HLD, CKD IIIa,  Chronic Anemia, GERD, Depression Lumbar stenosis, BPH with LUTS Pulmonary Nodule,  Renal Cysts,Tubular adenoma (due 2025)    # Boil with cellulitis on back at T4 level  Doxy  Warm compress   If does not drain in the next 2 days, go to ER to get it lanced    # HTN/ Hypokalemia/ CKD IIIa  Stable  Continue current medications  Minimize NSAIDS    # HLD  Stable  Continue current medications  Watch salt, avoid excessive caffeine  Avoid processed meats/ sugars/juices Instead eat fresh fruit  Add walnuts and almonds to your diet  exercise 6 days a week for 30 minutes    # COPD/ Tobacco dependence  Counseled again 3 min    Total appt time today was 35  minutes. Time included preparing to see the pt, obtaining the hx, performing the medically necessary appropriate physical exam, counseling & educating the pt, ordering tests & procedures, referring & communicating w/other providers, independently interpreting results & communicating the results to the pt, care, coordination & documenting clinical information in the medical record.

## 2024-07-02 DIAGNOSIS — I10 ESSENTIAL HYPERTENSION: ICD-10-CM

## 2024-07-02 RX ORDER — HYDROCHLOROTHIAZIDE 12.5 MG/1
12.5 TABLET ORAL EVERY MORNING
Qty: 60 TABLET | Refills: 3 | Status: SHIPPED | OUTPATIENT
Start: 2024-07-02 | End: 2025-02-27

## 2024-09-13 ENCOUNTER — APPOINTMENT (OUTPATIENT)
Dept: PRIMARY CARE | Facility: CLINIC | Age: 84
End: 2024-09-13
Payer: MEDICARE

## 2024-09-13 VITALS — TEMPERATURE: 98.6 F | WEIGHT: 149.8 LBS | BODY MASS INDEX: 21.49 KG/M2

## 2024-09-13 DIAGNOSIS — J43.9 PULMONARY EMPHYSEMA, UNSPECIFIED EMPHYSEMA TYPE (MULTI): ICD-10-CM

## 2024-09-13 DIAGNOSIS — E78.49 OTHER HYPERLIPIDEMIA: ICD-10-CM

## 2024-09-13 DIAGNOSIS — E87.6 HYPOKALEMIA: ICD-10-CM

## 2024-09-13 DIAGNOSIS — I10 ESSENTIAL HYPERTENSION: Primary | ICD-10-CM

## 2024-09-13 PROCEDURE — 1160F RVW MEDS BY RX/DR IN RCRD: CPT | Performed by: INTERNAL MEDICINE

## 2024-09-13 PROCEDURE — 99214 OFFICE O/P EST MOD 30 MIN: CPT | Performed by: INTERNAL MEDICINE

## 2024-09-13 PROCEDURE — 1159F MED LIST DOCD IN RCRD: CPT | Performed by: INTERNAL MEDICINE

## 2024-09-13 RX ORDER — POTASSIUM CHLORIDE 20 MEQ/1
20 TABLET, EXTENDED RELEASE ORAL DAILY
Qty: 90 TABLET | Refills: 3 | Status: SHIPPED | OUTPATIENT
Start: 2024-09-13 | End: 2025-09-08

## 2024-09-13 RX ORDER — SIMVASTATIN 40 MG/1
40 TABLET, FILM COATED ORAL EVERY EVENING
Qty: 90 TABLET | Refills: 2 | Status: SHIPPED | OUTPATIENT
Start: 2024-09-13 | End: 2025-06-10

## 2024-09-13 RX ORDER — HYDROCHLOROTHIAZIDE 12.5 MG/1
12.5 TABLET ORAL EVERY MORNING
Qty: 60 TABLET | Refills: 3 | Status: SHIPPED | OUTPATIENT
Start: 2024-09-13 | End: 2025-05-11

## 2024-09-17 DIAGNOSIS — I10 ESSENTIAL HYPERTENSION: ICD-10-CM

## 2024-09-17 RX ORDER — HYDROCHLOROTHIAZIDE 12.5 MG/1
12.5 TABLET ORAL EVERY MORNING
Qty: 90 TABLET | Refills: 3 | Status: SHIPPED | OUTPATIENT
Start: 2024-09-17 | End: 2025-05-15

## 2024-11-07 DIAGNOSIS — I10 ESSENTIAL HYPERTENSION: ICD-10-CM

## 2024-11-07 RX ORDER — HYDROCHLOROTHIAZIDE 12.5 MG/1
12.5 TABLET ORAL EVERY MORNING
Qty: 90 TABLET | Refills: 3 | Status: SHIPPED | OUTPATIENT
Start: 2024-11-07 | End: 2025-07-05

## 2024-11-11 DIAGNOSIS — E87.6 HYPOKALEMIA: ICD-10-CM

## 2024-11-11 RX ORDER — POTASSIUM CHLORIDE 20 MEQ/1
20 TABLET, EXTENDED RELEASE ORAL DAILY
Qty: 90 TABLET | Refills: 3 | Status: SHIPPED | OUTPATIENT
Start: 2024-11-11 | End: 2025-11-06

## 2024-12-19 ENCOUNTER — APPOINTMENT (OUTPATIENT)
Dept: PRIMARY CARE | Facility: CLINIC | Age: 84
End: 2024-12-19
Payer: MEDICARE

## 2024-12-19 VITALS
SYSTOLIC BLOOD PRESSURE: 120 MMHG | RESPIRATION RATE: 18 BRPM | DIASTOLIC BLOOD PRESSURE: 70 MMHG | HEART RATE: 68 BPM | BODY MASS INDEX: 22.21 KG/M2 | WEIGHT: 154.8 LBS

## 2024-12-19 DIAGNOSIS — E78.49 OTHER HYPERLIPIDEMIA: ICD-10-CM

## 2024-12-19 DIAGNOSIS — M48.061 SPINAL STENOSIS OF LUMBAR REGION, UNSPECIFIED WHETHER NEUROGENIC CLAUDICATION PRESENT: ICD-10-CM

## 2024-12-19 DIAGNOSIS — N18.31 STAGE 3A CHRONIC KIDNEY DISEASE (MULTI): ICD-10-CM

## 2024-12-19 DIAGNOSIS — R35.1 BENIGN PROSTATIC HYPERPLASIA WITH NOCTURIA: ICD-10-CM

## 2024-12-19 DIAGNOSIS — N40.1 BENIGN PROSTATIC HYPERPLASIA WITH NOCTURIA: ICD-10-CM

## 2024-12-19 DIAGNOSIS — I10 ESSENTIAL HYPERTENSION: Primary | ICD-10-CM

## 2024-12-19 PROCEDURE — 3078F DIAST BP <80 MM HG: CPT | Performed by: INTERNAL MEDICINE

## 2024-12-19 PROCEDURE — G2211 COMPLEX E/M VISIT ADD ON: HCPCS | Performed by: INTERNAL MEDICINE

## 2024-12-19 PROCEDURE — 1159F MED LIST DOCD IN RCRD: CPT | Performed by: INTERNAL MEDICINE

## 2024-12-19 PROCEDURE — 3074F SYST BP LT 130 MM HG: CPT | Performed by: INTERNAL MEDICINE

## 2024-12-19 PROCEDURE — 99214 OFFICE O/P EST MOD 30 MIN: CPT | Performed by: INTERNAL MEDICINE

## 2024-12-19 PROCEDURE — 1160F RVW MEDS BY RX/DR IN RCRD: CPT | Performed by: INTERNAL MEDICINE

## 2024-12-19 NOTE — PROGRESS NOTES
Gordon Camilo is a 84 y.o. male   Patient with a past medical history of COPD, HTN, HLD, CKD IIIa,  Chronic Anemia, GERD, Depression Lumbar stenosis, BPH with LUTS Pulmonary Nodule,  Renal Cysts,Tubular adenoma (due 2025)      No chest pain/  SOB/ dizziness  BM OK  Energy level ok  Appetite OK    Nocturia x 2             Review of Systems     Constitutional: no fever, no chills, not feeling poorly, not feeling tired and no recent weight gain, no recent weight loss.   ENT: no earache, no hearing loss, no nosebleeds, no nasal discharge, no sore throat and no hoarseness.   Cardiovascular: the heart rate was not slow, the heart rate was not fast, no chest pain, no palpitations, no intermittent leg claudication and no lower extremity edema.   Respiratory: no cough, wheezing or shortness of breath at rest or exertion  Gastrointestinal: no abdominal pain, no constipation, no melena, no nausea, no diarrhea, no vomiting and no blood in stools.   Musculoskeletal: no arthralgias, no myalgias, no back pain, no joint swelling, no joint stiffness, no limb pain and no limb swelling.   Integumentary: no skin rashes, no skin lesions, no itching, no skin wound and no dry skin.   Neurological: no headache, no confusion, no numbness, no dizziness, no tingling and no fainting.   All other systems have been reviewed and are negative for complaint.       Vitals:    12/19/24 1642   BP: 120/70   Pulse: 68   Resp: 18            Physical Exam     Constitutional   General appearance: Alert and in no acute distress.     Pulmonary   Respiratory assessment: No respiratory distress, normal respiratory rhythm and effort.    Auscultation of Lungs: Clear bilateral breath sounds.   Cardiovascular   Auscultation of heart: Apical pulse normal, heart rate and rhythm normal, normal S1 and S2, no murmurs and no pericardial rub.    Exam for edema: No peripheral edema.   Abdomen   Abdominal Exam: No bruits, normal bowel sounds, soft, non-tender, no abdominal  mass palpated.    Liver and Spleen exam: No hepato-splenomegaly.   Musculoskeletal   Examination of gait: Normal.    Inspection of digits and nails: No clubbing or cyanosis of the fingernails.    Inspection/palpation of joints, bones and muscles: No joint swelling. Normal movement of all extremities.   Skin   Skin inspection: Normal skin color and pigmentation, normal skin turgor and no visible rash.   Neurologic   Cranial nerves: Nerves 2-12 were intact, no focal neuro defects.      Assessment/Plan        Patient with a past medical history of COPD, HTN, HLD, CKD IIIa,  Chronic Anemia, GERD, Depression Lumbar stenosis, BPH with LUTS Pulmonary Nodule,  Renal Cysts,Tubular adenoma (due 2025)        # HTN/ CKD IIIa  Stable  Continue current medications  Minimize NSAIDS    # HLD  Stable  Continue current medications  Watch salt, avoid excessive caffeine  Avoid processed meats/ sugars/juices Instead eat fresh fruit  Add walnuts and almonds to your diet  exercise 6 days a week for 30 minutes    # COPD/ Tobacco dependence  Counseled again 3 min    # BPH with LUTS  Nocturia x 2    # Lumbar stenosis  Minimal pain  Uses Ibuprofen sparingly    Total appt time today was 35  minutes. Time included preparing to see the pt, obtaining the hx, performing the medically necessary appropriate physical exam, counseling & educating the pt, ordering tests & procedures, referring & communicating w/other providers, independently interpreting results & communicating the results to the pt, care, coordination & documenting clinical information in the medical record.

## 2025-03-11 ENCOUNTER — TELEPHONE (OUTPATIENT)
Dept: PRIMARY CARE | Facility: CLINIC | Age: 85
End: 2025-03-11
Payer: MEDICARE

## 2025-03-11 DIAGNOSIS — M54.16 LUMBAR RADICULOPATHY: Primary | ICD-10-CM

## 2025-03-11 RX ORDER — NAPROXEN 500 MG/1
500 TABLET ORAL
Qty: 60 TABLET | Refills: 1 | Status: SHIPPED | OUTPATIENT
Start: 2025-03-11 | End: 2025-05-10

## 2025-03-11 NOTE — TELEPHONE ENCOUNTER
Patient is requesting a rx for naproxen I see it's in his profile but he haven't got it in a while is it ok to fill

## 2025-03-21 ENCOUNTER — APPOINTMENT (OUTPATIENT)
Dept: PRIMARY CARE | Facility: CLINIC | Age: 85
End: 2025-03-21
Payer: MEDICARE

## 2025-04-07 ENCOUNTER — APPOINTMENT (OUTPATIENT)
Dept: PRIMARY CARE | Facility: CLINIC | Age: 85
End: 2025-04-07
Payer: MEDICARE

## 2025-05-12 ENCOUNTER — APPOINTMENT (OUTPATIENT)
Dept: PRIMARY CARE | Facility: CLINIC | Age: 85
End: 2025-05-12
Payer: MEDICARE

## 2025-05-12 VITALS
SYSTOLIC BLOOD PRESSURE: 110 MMHG | BODY MASS INDEX: 20.58 KG/M2 | HEART RATE: 72 BPM | DIASTOLIC BLOOD PRESSURE: 60 MMHG | RESPIRATION RATE: 18 BRPM | TEMPERATURE: 98.2 F | WEIGHT: 143.4 LBS

## 2025-05-12 DIAGNOSIS — J43.9 PULMONARY EMPHYSEMA, UNSPECIFIED EMPHYSEMA TYPE (MULTI): ICD-10-CM

## 2025-05-12 DIAGNOSIS — N18.31 STAGE 3A CHRONIC KIDNEY DISEASE (MULTI): ICD-10-CM

## 2025-05-12 DIAGNOSIS — I10 ESSENTIAL HYPERTENSION: ICD-10-CM

## 2025-05-12 DIAGNOSIS — F17.200 TOBACCO DEPENDENCE: ICD-10-CM

## 2025-05-12 DIAGNOSIS — E78.49 OTHER HYPERLIPIDEMIA: Primary | ICD-10-CM

## 2025-05-12 PROCEDURE — 1160F RVW MEDS BY RX/DR IN RCRD: CPT | Performed by: INTERNAL MEDICINE

## 2025-05-12 PROCEDURE — 99214 OFFICE O/P EST MOD 30 MIN: CPT | Performed by: INTERNAL MEDICINE

## 2025-05-12 PROCEDURE — 99497 ADVNCD CARE PLAN 30 MIN: CPT | Performed by: INTERNAL MEDICINE

## 2025-05-12 PROCEDURE — G0439 PPPS, SUBSEQ VISIT: HCPCS | Performed by: INTERNAL MEDICINE

## 2025-05-12 PROCEDURE — 3078F DIAST BP <80 MM HG: CPT | Performed by: INTERNAL MEDICINE

## 2025-05-12 PROCEDURE — G0444 DEPRESSION SCREEN ANNUAL: HCPCS | Performed by: INTERNAL MEDICINE

## 2025-05-12 PROCEDURE — 3074F SYST BP LT 130 MM HG: CPT | Performed by: INTERNAL MEDICINE

## 2025-05-12 PROCEDURE — 1124F ACP DISCUSS-NO DSCNMKR DOCD: CPT | Performed by: INTERNAL MEDICINE

## 2025-05-12 PROCEDURE — 4004F PT TOBACCO SCREEN RCVD TLK: CPT | Performed by: INTERNAL MEDICINE

## 2025-05-12 PROCEDURE — 1159F MED LIST DOCD IN RCRD: CPT | Performed by: INTERNAL MEDICINE

## 2025-05-12 ASSESSMENT — COLUMBIA-SUICIDE SEVERITY RATING SCALE - C-SSRS
6. HAVE YOU EVER DONE ANYTHING, STARTED TO DO ANYTHING, OR PREPARED TO DO ANYTHING TO END YOUR LIFE?: NO
2. HAVE YOU ACTUALLY HAD ANY THOUGHTS OF KILLING YOURSELF?: NO
1. IN THE PAST MONTH, HAVE YOU WISHED YOU WERE DEAD OR WISHED YOU COULD GO TO SLEEP AND NOT WAKE UP?: NO

## 2025-05-12 ASSESSMENT — PATIENT HEALTH QUESTIONNAIRE - PHQ9
SUM OF ALL RESPONSES TO PHQ9 QUESTIONS 1 AND 2: 0
2. FEELING DOWN, DEPRESSED OR HOPELESS: NOT AT ALL
1. LITTLE INTEREST OR PLEASURE IN DOING THINGS: NOT AT ALL

## 2025-05-12 ASSESSMENT — ENCOUNTER SYMPTOMS
OCCASIONAL FEELINGS OF UNSTEADINESS: 0
DEPRESSION: 0
LOSS OF SENSATION IN FEET: 0

## 2025-05-12 NOTE — PROGRESS NOTES
"Gordon Camilo is a 85 y.o. male here for a Medicare Wellness Exam.    Chief Complaint   Patient presents with    Annual Exam        Patient with a past medical history of COPD, HTN, HLD, CKD IIIa,  Chronic Anemia, GERD, Depression Lumbar stenosis, BPH with LUTS Pulmonary Nodule,  Renal Cysts,Tubular adenoma (due 2025)    A month ago had a sick spell  Went to [facility] and was given ATB  Now feels better    Feels fine  No chest pain/  SOB/ dizziness  BM OK  Energy level ok  Appetite OK             Medicare Wellness Exam    The patent is being seen for a follow up annual wellness visit  Past Medical, Surgical and family History: Reviewed and updated in chart  Interval History: Patient has not been hospitalized previously  Medications and Supplements: Review of all medications by a prescribing practitioner or clinical pharmacist (such as prescriptions, OTC, Herbal therapies and supplements) documented in the medical record.    Patient Self-Assessment of health: Excellent  Tobacco Use: Yes  Alcohol Use: Yes  Illicit drug use: No  Patient using opioids: No    Current Diet: in general, a \"healthy\" diet    Adequate fluid intake: Yes  Caffeine intake: Yes  Exercise frequency: not active    Depression/Suicide screening: PHQ2/ PHQ9 (see screenings tab)    Hearing impairment: No  Uses hearing aids N/A  Cognitive impairment Observation: Yes   Patient or family reported cognitive impairment: No    Bathing: independent  Dressing: independent  Walking: independent  Taking Medications: independent  Feeding: independent  Personal Hygiene: independent  Managing Finances: independent  Shopping: independent  Housework/Basic Home Maintenance: independent  Handling transportation: independent  Preparing meals: independent    Bowels: continent  Bladder: continent    Falls Risk: has notfallen in last 6 months.   Their fall has not resulted in an injury.   Fall risk Factors: Fall Risk Factors: Antihypertensive Use mild   Care Plan Risk: Care " Plan: Low/Moderate Risk: Regular physical activity such as walking, water aerobics or arcenio chi to improve strength, balance, coordination and flexibility. Wear appropriate, sensible shoe wear. Remove fall hazards at home such as loose rugs, obstacles, use non-slip surface in bath or shower. Keep living space well lit.      Home Safety Risk Factors: Home Safety Risk Factors: None  Advanced Directives:  Living will: No POA: No    Patient's End of Life Decisions: Provider agree to follow.      Medical History[1]     Current Outpatient Medications   Medication Instructions    cholecalciferol (Vitamin D-3) 25 MCG (1000 UT) capsule Take by mouth.    cholecalciferol (Vitamin D-3) 5,000 Units tablet 1 tablet, oral, Daily    hydroCHLOROthiazide (MICROZIDE) 12.5 mg, oral, Every morning    meclizine (Antivert) 25 mg tablet 1 tablet, 3 times daily PRN    pneumococcal polysaccharide (Pneumovax-23) 25 mcg/0.5 mL vaccine one shot    potassium chloride CR 20 mEq ER tablet 20 mEq, oral, Daily    simvastatin (ZOCOR) 40 mg, oral, Every evening       Review of Systems     Constitutional: no fever, no chills, not feeling poorly, not feeling tired and no recent weight gain, no recent weight loss.   ENT: no earache, no hearing loss, no nosebleeds, no nasal discharge, no sore throat and no hoarseness.   Cardiovascular: the heart rate was not slow, the heart rate was not fast, no chest pain, no palpitations, no intermittent leg claudication and no lower extremity edema.   Respiratory: no cough, wheezing or shortness of breath at rest or exertion  Gastrointestinal: no abdominal pain, no constipation, no melena, no nausea, no diarrhea, no vomiting and no blood in stools.   Musculoskeletal: no arthralgias, no myalgias, no back pain, no joint swelling, no joint stiffness, no limb pain and no limb swelling.   Integumentary: no skin rashes, no skin lesions, no itching, no skin wound and no dry skin.   Neurological: no headache, no confusion, no  numbness, no dizziness, no tingling and no fainting.   All other systems have been reviewed and are negative for complaint.        Physical Exam    Constitutional   General appearance: Alert and in no acute distress.     Pulmonary   Respiratory assessment: No respiratory distress, normal respiratory rhythm and effort.    Auscultation of Lungs: Clear bilateral breath sounds.   Cardiovascular   Auscultation of heart: Apical pulse normal, heart rate and rhythm normal, normal S1 and S2, no murmurs and no pericardial rub.    Exam for edema: No peripheral edema.   Abdomen   Abdominal Exam: No bruits, normal bowel sounds, soft, non-tender, no abdominal mass palpated.    Liver and Spleen exam: No hepato-splenomegaly.   Musculoskeletal   Examination of gait: Normal.    Inspection of digits and nails: No clubbing or cyanosis of the fingernails.    Inspection/palpation of joints, bones and muscles: No joint swelling. Normal movement of all extremities.   Skin   Skin inspection: Normal skin color and pigmentation, normal skin turgor and no visible rash.   Neurologic   Cranial nerves: Nerves 2-12 were intact, no focal neuro defects.       Assessment/Plan          Patient with a past medical history of COPD, HTN, HLD, CKD IIIa,  Chronic Anemia, GERD, Depression Lumbar stenosis, BPH with LUTS Pulmonary Nodule,  Renal Cysts,Tubular adenoma (due 2025)           # HTN/ CKD IIIa  Stable  Continue current medications  Minimize NSAIDS     # HLD  Stable  Continue current medications  Watch salt, avoid excessive caffeine  Avoid processed meats/ sugars/juices Instead eat fresh fruit  Add walnuts and almonds to your diet  exercise 6 days a week for 30 minutes     # COPD/ Tobacco dependence  Counseled again 3 min     # BPH with LUTS  Nocturia x 2     # Lumbar stenosis  Minimal pain  Uses Ibuprofen sparingly    Recommend PNA VAC      Advance Directives Discussion  less than 30 minutes spent discussing Advanced Care Planning (including a  Living Will, Healthcare POA, as well as specific end of life choices and/or directives). The details of that discussion were documented in Advanced Directives Discussion section of the medical record.         Depression Screening  _5_ minutes  were spent screening for depression using PHQ2/PHQ9 as documented in the chart.     Alcohol Screening  __ minutes were spent screening for alcohol use/misuse as documented in the chart.     Alcohol Use Counseling  _1_ minutes were spent counseling the patient and offering support/resources on alcohol use disorder.        Tobacco Counseling  _3_ minutes were spent counseling the patient on tobacco cessation.  Benefits of cessation were discussed as well as techniques to help quit.      Cardiac Risk Assessment  __minutes were spent assessing and discussing cardiovascular risk was and, if needed, lifestyle modifications recommended, including nutritional choices, exercise, and elimination of habits contributing to risk. Aspirin use/disuse was discussed following the guidelines below.     Low dose ASA ( mg) should be considered:  If you have prior Heart Attack/Stroke/Peripheral vascular disease:  Generally recommend daily low dose aspirin unless extremely high bleeding risk (e.g., gastrointestinal).     If you do not have prior Heart Attack/Stroke/Peripheral vascular disease:    Age < 70 and your 10-year cardiovascular disease risk is >20%, use low dose Aspirin   Age >=70: Do not use Aspirin for prevention  Low Dose CT Screening  We discussed the pros and cons of low dose CT screening for lung cancer based on the patient's smoking history.  This screening is recommended for patients who:  Are between the age of 50 to 77  Have a 20 pack-year smoking history (20 years of smoking a pack a day)  Are either a current smoker or have quit smoking within the last 15 years  Are in good health - no new cough or unexplained weight loss  Are willing to do the follow-up testing and  treatment, if needed  Have not had a chest CT (CAT) scan in the last year       [1]   Past Medical History:  Diagnosis Date    Chronic sinusitis, unspecified 09/23/2015    Chronic sinusitis    Essential (primary) hypertension 10/17/2022    Hypertension    Male erectile dysfunction, unspecified 06/14/2013    Organic impotence    Meniere's disease, 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

## 2025-05-17 LAB
ALBUMIN SERPL-MCNC: 4.3 G/DL (ref 3.6–5.1)
ALP SERPL-CCNC: 63 U/L (ref 35–144)
ALT SERPL-CCNC: 10 U/L (ref 9–46)
ANION GAP SERPL CALCULATED.4IONS-SCNC: 9 MMOL/L (CALC) (ref 7–17)
AST SERPL-CCNC: 15 U/L (ref 10–35)
BILIRUB SERPL-MCNC: 0.3 MG/DL (ref 0.2–1.2)
BUN SERPL-MCNC: 20 MG/DL (ref 7–25)
CALCIUM SERPL-MCNC: 9.1 MG/DL (ref 8.6–10.3)
CHLORIDE SERPL-SCNC: 104 MMOL/L (ref 98–110)
CHOLEST SERPL-MCNC: 180 MG/DL
CHOLEST/HDLC SERPL: 2.3 (CALC)
CO2 SERPL-SCNC: 28 MMOL/L (ref 20–32)
CREAT SERPL-MCNC: 1.15 MG/DL (ref 0.7–1.22)
EGFRCR SERPLBLD CKD-EPI 2021: 63 ML/MIN/1.73M2
ERYTHROCYTE [DISTWIDTH] IN BLOOD BY AUTOMATED COUNT: 14.5 % (ref 11–15)
GLUCOSE SERPL-MCNC: 99 MG/DL (ref 65–139)
HCT VFR BLD AUTO: 38.3 % (ref 38.5–50)
HDLC SERPL-MCNC: 79 MG/DL
HGB BLD-MCNC: 12.5 G/DL (ref 13.2–17.1)
LDLC SERPL CALC-MCNC: 87 MG/DL (CALC)
MCH RBC QN AUTO: 32.6 PG (ref 27–33)
MCHC RBC AUTO-ENTMCNC: 32.6 G/DL (ref 32–36)
MCV RBC AUTO: 99.7 FL (ref 80–100)
NONHDLC SERPL-MCNC: 101 MG/DL (CALC)
PLATELET # BLD AUTO: 255 THOUSAND/UL (ref 140–400)
PMV BLD REES-ECKER: 10 FL (ref 7.5–12.5)
POTASSIUM SERPL-SCNC: 4.2 MMOL/L (ref 3.5–5.3)
PROT SERPL-MCNC: 7 G/DL (ref 6.1–8.1)
RBC # BLD AUTO: 3.84 MILLION/UL (ref 4.2–5.8)
SODIUM SERPL-SCNC: 141 MMOL/L (ref 135–146)
T4 FREE SERPL-MCNC: 1.1 NG/DL (ref 0.8–1.8)
TRIGL SERPL-MCNC: 62 MG/DL
TSH SERPL-ACNC: 0.22 MIU/L (ref 0.4–4.5)
WBC # BLD AUTO: 6 THOUSAND/UL (ref 3.8–10.8)

## 2025-05-28 ENCOUNTER — TELEPHONE (OUTPATIENT)
Dept: PRIMARY CARE | Facility: CLINIC | Age: 85
End: 2025-05-28

## 2025-05-28 DIAGNOSIS — J43.9 PULMONARY EMPHYSEMA, UNSPECIFIED EMPHYSEMA TYPE (MULTI): ICD-10-CM

## 2025-05-28 DIAGNOSIS — M54.16 LUMBAR RADICULOPATHY: ICD-10-CM

## 2025-06-23 DIAGNOSIS — E87.6 HYPOKALEMIA: ICD-10-CM

## 2025-06-23 RX ORDER — POTASSIUM CHLORIDE 20 MEQ/1
20 TABLET, EXTENDED RELEASE ORAL DAILY
Qty: 90 TABLET | Refills: 3 | Status: SHIPPED | OUTPATIENT
Start: 2025-06-23 | End: 2026-06-18

## 2025-08-04 ENCOUNTER — APPOINTMENT (OUTPATIENT)
Dept: PRIMARY CARE | Facility: CLINIC | Age: 85
End: 2025-08-04
Payer: MEDICARE

## 2025-08-04 VITALS
HEART RATE: 74 BPM | SYSTOLIC BLOOD PRESSURE: 120 MMHG | DIASTOLIC BLOOD PRESSURE: 70 MMHG | BODY MASS INDEX: 20.83 KG/M2 | TEMPERATURE: 98.2 F | RESPIRATION RATE: 16 BRPM | WEIGHT: 145.2 LBS

## 2025-08-04 DIAGNOSIS — E78.49 OTHER HYPERLIPIDEMIA: ICD-10-CM

## 2025-08-04 DIAGNOSIS — M48.061 SPINAL STENOSIS OF LUMBAR REGION, UNSPECIFIED WHETHER NEUROGENIC CLAUDICATION PRESENT: Primary | ICD-10-CM

## 2025-08-04 DIAGNOSIS — E87.6 HYPOKALEMIA: ICD-10-CM

## 2025-08-04 DIAGNOSIS — I10 ESSENTIAL HYPERTENSION: ICD-10-CM

## 2025-08-04 PROCEDURE — 3078F DIAST BP <80 MM HG: CPT | Performed by: INTERNAL MEDICINE

## 2025-08-04 PROCEDURE — 99214 OFFICE O/P EST MOD 30 MIN: CPT | Performed by: INTERNAL MEDICINE

## 2025-08-04 PROCEDURE — 1160F RVW MEDS BY RX/DR IN RCRD: CPT | Performed by: INTERNAL MEDICINE

## 2025-08-04 PROCEDURE — G2211 COMPLEX E/M VISIT ADD ON: HCPCS | Performed by: INTERNAL MEDICINE

## 2025-08-04 PROCEDURE — 3074F SYST BP LT 130 MM HG: CPT | Performed by: INTERNAL MEDICINE

## 2025-08-04 PROCEDURE — 1159F MED LIST DOCD IN RCRD: CPT | Performed by: INTERNAL MEDICINE

## 2025-08-04 RX ORDER — POTASSIUM CHLORIDE 20 MEQ/1
20 TABLET, EXTENDED RELEASE ORAL DAILY
Qty: 90 TABLET | Refills: 3 | Status: SHIPPED | OUTPATIENT
Start: 2025-08-04 | End: 2026-07-30

## 2025-08-04 RX ORDER — SIMVASTATIN 40 MG/1
40 TABLET, FILM COATED ORAL EVERY EVENING
Qty: 90 TABLET | Refills: 2 | Status: SHIPPED | OUTPATIENT
Start: 2025-08-04 | End: 2026-05-01

## 2025-08-04 RX ORDER — HYDROCHLOROTHIAZIDE 12.5 MG/1
12.5 TABLET ORAL EVERY MORNING
Qty: 90 TABLET | Refills: 3 | Status: SHIPPED | OUTPATIENT
Start: 2025-08-04 | End: 2026-04-01

## 2025-08-04 NOTE — PROGRESS NOTES
Gordon Camilo is a 85 y.o. male here for a Medicare Wellness Exam.    Chief Complaint   Patient presents with    Follow-up        Patient with a past medical history of COPD, HTN, HLD, CKD IIIa,  Chronic Anemia, GERD, Depression Lumbar stenosis, BPH with LUTS Pulmonary Nodule,  Renal Cysts,Tubular adenoma (due 2025)    Labs reviewed  Continues to smoke    Feels fine  No chest pain/  SOB/ dizziness  BM OK  Energy level ok  Appetite OK                   Medical History[1]     Current Outpatient Medications   Medication Instructions    cholecalciferol (Vitamin D-3) 25 MCG (1000 UT) capsule Take by mouth.    cholecalciferol (Vitamin D-3) 5,000 Units tablet 1 tablet, oral, Daily    hydroCHLOROthiazide (MICROZIDE) 12.5 mg, oral, Every morning    meclizine (Antivert) 25 mg tablet 1 tablet, 3 times daily PRN    pneumococcal polysaccharide (Pneumovax-23) 25 mcg/0.5 mL vaccine one shot    potassium chloride CR 20 mEq ER tablet 20 mEq, oral, Daily    simvastatin (ZOCOR) 40 mg, oral, Every evening       Review of Systems     Constitutional: no fever, no chills, not feeling poorly, not feeling tired and no recent weight gain, no recent weight loss.   ENT: no earache, no hearing loss, no nosebleeds, no nasal discharge, no sore throat and no hoarseness.   Cardiovascular: the heart rate was not slow, the heart rate was not fast, no chest pain, no palpitations, no intermittent leg claudication and no lower extremity edema.   Respiratory: no cough, wheezing or shortness of breath at rest or exertion  Gastrointestinal: no abdominal pain, no constipation, no melena, no nausea, no diarrhea, no vomiting and no blood in stools.   Musculoskeletal: no arthralgias, no myalgias, no back pain, no joint swelling, no joint stiffness, no limb pain and no limb swelling.   Integumentary: no skin rashes, no skin lesions, no itching, no skin wound and no dry skin.   Neurological: no headache, no confusion, no numbness, no dizziness, no tingling and  no fainting.   All other systems have been reviewed and are negative for complaint.        Physical Exam    Constitutional   General appearance: Alert and in no acute distress.     Pulmonary   Respiratory assessment: No respiratory distress, normal respiratory rhythm and effort.    Auscultation of Lungs: Clear bilateral breath sounds.   Cardiovascular   Auscultation of heart: Apical pulse normal, heart rate and rhythm normal, normal S1 and S2, no murmurs and no pericardial rub.    Exam for edema: No peripheral edema.   Abdomen   Abdominal Exam: No bruits, normal bowel sounds, soft, non-tender, no abdominal mass palpated.    Liver and Spleen exam: No hepato-splenomegaly.   Musculoskeletal   Examination of gait: Normal.    Inspection of digits and nails: No clubbing or cyanosis of the fingernails.    Inspection/palpation of joints, bones and muscles: No joint swelling. Normal movement of all extremities.   Skin   Skin inspection: Normal skin color and pigmentation, normal skin turgor and no visible rash.   Neurologic   Cranial nerves: Nerves 2-12 were intact, no focal neuro defects.       Assessment/Plan          Patient with a past medical history of COPD, HTN, HLD, CKD IIIa,  Chronic Anemia, GERD, Depression Lumbar stenosis, BPH with LUTS Pulmonary Nodule,  Renal Cysts,Tubular adenoma (due 2025)           # HTN/ CKD IIIa  Stable  Continue current medications  Minimize NSAIDS     # HLD  Stable  Continue current medications  Watch salt, avoid excessive caffeine  Avoid processed meats/ sugars/juices Instead eat fresh fruit  Add walnuts and almonds to your diet  exercise 6 days a week for 30 minutes     # COPD/ Tobacco dependence  Counseled again 3 min  No SOB , No weight loss  No chronic cough     # BPH with LUTS  Nocturia x 2     # Lumbar stenosis  Minimal pain  Uses Ibuprofen sparingly  Rx for Handicap placard    Recommend PNA VAC again               [1]   Past Medical History:  Diagnosis Date    Chronic sinusitis,  unspecified 09/23/2015    Chronic sinusitis    Essential (primary) hypertension 10/17/2022    Hypertension    Male erectile dysfunction, unspecified 06/14/2013    Organic impotence    Meniere's disease, 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

## 2025-11-03 ENCOUNTER — APPOINTMENT (OUTPATIENT)
Dept: PRIMARY CARE | Facility: CLINIC | Age: 85
End: 2025-11-03
Payer: MEDICARE